# Patient Record
Sex: MALE | Race: WHITE | NOT HISPANIC OR LATINO
[De-identification: names, ages, dates, MRNs, and addresses within clinical notes are randomized per-mention and may not be internally consistent; named-entity substitution may affect disease eponyms.]

---

## 2020-10-01 ENCOUNTER — OFFICE VISIT (OUTPATIENT)
Age: 55
End: 2020-10-01

## 2021-01-11 ENCOUNTER — OFFICE VISIT (OUTPATIENT)
Age: 56
End: 2021-01-11

## 2021-02-13 ENCOUNTER — OFFICE VISIT (OUTPATIENT)
Age: 56
End: 2021-02-13

## 2021-03-31 ENCOUNTER — TELEPHONE ENCOUNTER (OUTPATIENT)
Dept: URBAN - METROPOLITAN AREA CLINIC 9 | Facility: CLINIC | Age: 56
End: 2021-03-31

## 2021-03-31 ENCOUNTER — OFFICE VISIT (OUTPATIENT)
Age: 56
End: 2021-03-31

## 2021-04-12 ENCOUNTER — OFFICE VISIT (OUTPATIENT)
Dept: URBAN - METROPOLITAN AREA CLINIC 9 | Facility: CLINIC | Age: 56
End: 2021-04-12

## 2021-04-29 ENCOUNTER — TELEPHONE ENCOUNTER (OUTPATIENT)
Dept: URBAN - METROPOLITAN AREA CLINIC 9 | Facility: CLINIC | Age: 56
End: 2021-04-29

## 2021-04-29 ENCOUNTER — OFFICE VISIT (OUTPATIENT)
Dept: URBAN - METROPOLITAN AREA CLINIC 9 | Facility: CLINIC | Age: 56
End: 2021-04-29

## 2021-05-13 ENCOUNTER — TELEPHONE ENCOUNTER (OUTPATIENT)
Dept: URBAN - METROPOLITAN AREA CLINIC 9 | Facility: CLINIC | Age: 56
End: 2021-05-13

## 2021-05-14 ENCOUNTER — OFFICE VISIT (OUTPATIENT)
Dept: URBAN - METROPOLITAN AREA CLINIC 7 | Facility: CLINIC | Age: 56
End: 2021-05-14

## 2021-05-17 ENCOUNTER — OFFICE VISIT (OUTPATIENT)
Dept: URBAN - METROPOLITAN AREA SURGERY CENTER 9 | Facility: SURGERY CENTER | Age: 56
End: 2021-05-17

## 2021-05-17 ENCOUNTER — TELEPHONE ENCOUNTER (OUTPATIENT)
Dept: URBAN - METROPOLITAN AREA CLINIC 9 | Facility: CLINIC | Age: 56
End: 2021-05-17

## 2021-05-19 ENCOUNTER — TELEPHONE ENCOUNTER (OUTPATIENT)
Dept: URBAN - METROPOLITAN AREA CLINIC 9 | Facility: CLINIC | Age: 56
End: 2021-05-19

## 2021-05-19 ENCOUNTER — OFFICE VISIT (OUTPATIENT)
Dept: URBAN - METROPOLITAN AREA CLINIC 7 | Facility: CLINIC | Age: 56
End: 2021-05-19

## 2021-05-24 ENCOUNTER — OFFICE VISIT (OUTPATIENT)
Dept: URBAN - METROPOLITAN AREA CLINIC 7 | Facility: CLINIC | Age: 56
End: 2021-05-24

## 2021-05-24 ENCOUNTER — OFFICE VISIT (OUTPATIENT)
Dept: URBAN - METROPOLITAN AREA SURGERY CENTER 9 | Facility: SURGERY CENTER | Age: 56
End: 2021-05-24

## 2021-05-28 ENCOUNTER — OFFICE VISIT (OUTPATIENT)
Dept: URBAN - METROPOLITAN AREA CLINIC 7 | Facility: CLINIC | Age: 56
End: 2021-05-28

## 2021-06-02 ENCOUNTER — OFFICE VISIT (OUTPATIENT)
Dept: URBAN - METROPOLITAN AREA SURGERY CENTER 5 | Facility: SURGERY CENTER | Age: 56
End: 2021-06-02

## 2021-06-03 ENCOUNTER — TELEPHONE ENCOUNTER (OUTPATIENT)
Dept: URBAN - METROPOLITAN AREA CLINIC 9 | Facility: CLINIC | Age: 56
End: 2021-06-03

## 2021-06-15 ENCOUNTER — TELEPHONE ENCOUNTER (OUTPATIENT)
Dept: URBAN - METROPOLITAN AREA CLINIC 9 | Facility: CLINIC | Age: 56
End: 2021-06-15

## 2021-06-18 ENCOUNTER — TELEPHONE ENCOUNTER (OUTPATIENT)
Dept: URBAN - METROPOLITAN AREA CLINIC 9 | Facility: CLINIC | Age: 56
End: 2021-06-18

## 2021-06-21 ENCOUNTER — TELEPHONE ENCOUNTER (OUTPATIENT)
Dept: URBAN - METROPOLITAN AREA CLINIC 9 | Facility: CLINIC | Age: 56
End: 2021-06-21

## 2021-06-28 ENCOUNTER — OFFICE VISIT (OUTPATIENT)
Dept: URBAN - METROPOLITAN AREA CLINIC 7 | Facility: CLINIC | Age: 56
End: 2021-06-28

## 2021-06-30 ENCOUNTER — TELEPHONE ENCOUNTER (OUTPATIENT)
Dept: URBAN - METROPOLITAN AREA CLINIC 9 | Facility: CLINIC | Age: 56
End: 2021-06-30

## 2021-07-13 ENCOUNTER — TELEPHONE ENCOUNTER (OUTPATIENT)
Dept: URBAN - METROPOLITAN AREA CLINIC 9 | Facility: CLINIC | Age: 56
End: 2021-07-13

## 2021-08-23 ENCOUNTER — OFFICE VISIT (OUTPATIENT)
Dept: URBAN - METROPOLITAN AREA CLINIC 7 | Facility: CLINIC | Age: 56
End: 2021-08-23

## 2021-10-29 ENCOUNTER — TELEPHONE ENCOUNTER (OUTPATIENT)
Dept: URBAN - METROPOLITAN AREA CLINIC 9 | Facility: CLINIC | Age: 56
End: 2021-10-29

## 2022-05-04 ENCOUNTER — OFFICE VISIT (OUTPATIENT)
Dept: URBAN - METROPOLITAN AREA CLINIC 7 | Facility: CLINIC | Age: 57
End: 2022-05-04

## 2022-05-09 ENCOUNTER — TELEPHONE ENCOUNTER (OUTPATIENT)
Dept: URBAN - METROPOLITAN AREA CLINIC 9 | Facility: CLINIC | Age: 57
End: 2022-05-09

## 2022-07-09 ENCOUNTER — TELEPHONE ENCOUNTER (OUTPATIENT)
Dept: URBAN - METROPOLITAN AREA CLINIC 121 | Facility: CLINIC | Age: 57
End: 2022-07-09

## 2022-07-10 ENCOUNTER — TELEPHONE ENCOUNTER (OUTPATIENT)
Dept: URBAN - METROPOLITAN AREA CLINIC 121 | Facility: CLINIC | Age: 57
End: 2022-07-10

## 2022-07-30 ENCOUNTER — TELEPHONE ENCOUNTER (OUTPATIENT)
Age: 57
End: 2022-07-30

## 2022-07-30 RX ORDER — DICYCLOMINE HYDROCHLORIDE 10 MG/1
1 (ONE) CAPSULE ORAL
Qty: 0 | Refills: 16 | OUTPATIENT
Start: 2021-05-14 | End: 2022-05-04

## 2022-07-30 RX ORDER — LORATADINE 10 MG
1 TABLET,DISINTEGRATING ORAL
Qty: 0 | Refills: 2 | OUTPATIENT
Start: 2021-05-12 | End: 2021-05-14

## 2022-07-30 RX ORDER — FAMOTIDINE 40 MG/1
1 TABLET, FILM COATED ORAL
Qty: 0 | Refills: 2 | OUTPATIENT
Start: 2021-04-12 | End: 2022-05-04

## 2022-07-30 RX ORDER — LINACLOTIDE 145 UG/1
1 (ONE) CAPSULE, GELATIN COATED ORAL DAILY
Qty: 0 | Refills: 4 | OUTPATIENT
Start: 2021-04-29 | End: 2021-05-14

## 2022-07-30 RX ORDER — LACTULOSE 10 G/15ML
1 SOLUTION ORAL
Qty: 0 | Refills: 2 | OUTPATIENT
Start: 2021-04-29 | End: 2021-05-12

## 2022-07-30 RX ORDER — HYOSCYAMINE SULFATE 0.12 MG/1
1 (ONE) TABLET ORAL
Qty: 0 | Refills: 2 | OUTPATIENT
Start: 2021-07-13 | End: 2021-08-12

## 2022-07-31 ENCOUNTER — TELEPHONE ENCOUNTER (OUTPATIENT)
Age: 57
End: 2022-07-31

## 2022-07-31 RX ORDER — LACTULOSE 10 G/15ML
1 SOLUTION ORAL
Qty: 0 | Refills: 2 | Status: ACTIVE | COMMUNITY
Start: 2021-04-29

## 2022-07-31 RX ORDER — HYOSCYAMINE SULFATE 0.12 MG/1
1 (ONE) TABLET ORAL
Qty: 0 | Refills: 2 | Status: ACTIVE | COMMUNITY
Start: 2021-07-13

## 2022-07-31 RX ORDER — HYOSCYAMINE SULFATE 0.12 MG/1
1 (ONE) TABLET ORAL
Qty: 0 | Refills: 2 | Status: ACTIVE | COMMUNITY
Start: 2021-06-18

## 2022-07-31 RX ORDER — LINACLOTIDE 145 UG/1
1 (ONE) CAPSULE, GELATIN COATED ORAL DAILY
Qty: 0 | Refills: 4 | Status: ACTIVE | COMMUNITY
Start: 2021-04-29

## 2022-07-31 RX ORDER — POLYETHYLENE GLYCOL 3350 17 G/17G
1 (ONE) POWDER, FOR SOLUTION ORAL TWICE DAILY
Qty: 0 | Refills: 5 | Status: ACTIVE | COMMUNITY
Start: 2022-05-04

## 2022-07-31 RX ORDER — LORATADINE 10 MG
1 TABLET,DISINTEGRATING ORAL
Qty: 0 | Refills: 2 | Status: ACTIVE | COMMUNITY
Start: 2021-05-12

## 2022-07-31 RX ORDER — SENNA 8.6 MG/1
1 (ONE) TABLET, COATED ORAL
Qty: 0 | Refills: 5 | Status: ACTIVE | COMMUNITY
Start: 2022-05-04

## 2022-07-31 RX ORDER — FAMOTIDINE 40 MG/1
1 TABLET, FILM COATED ORAL
Qty: 0 | Refills: 2 | Status: ACTIVE | COMMUNITY
Start: 2021-04-12

## 2022-07-31 RX ORDER — LORATADINE 10 MG
1 TABLET,DISINTEGRATING ORAL
Qty: 0 | Refills: 2 | Status: ACTIVE | COMMUNITY
Start: 2021-04-29

## 2022-07-31 RX ORDER — LACTULOSE 10 G/15ML
1 SOLUTION ORAL
Qty: 0 | Refills: 2 | Status: ACTIVE | COMMUNITY
Start: 2021-04-12

## 2022-07-31 RX ORDER — LORATADINE 10 MG
1 TABLET,DISINTEGRATING ORAL DAILY
Qty: 0 | Refills: 2 | Status: ACTIVE | COMMUNITY
Start: 2021-04-12

## 2022-07-31 RX ORDER — DICYCLOMINE HYDROCHLORIDE 10 MG/1
1 (ONE) CAPSULE ORAL
Qty: 0 | Refills: 16 | Status: ACTIVE | COMMUNITY
Start: 2021-05-14

## 2023-05-05 ENCOUNTER — TELEPHONE ENCOUNTER (OUTPATIENT)
Dept: URBAN - METROPOLITAN AREA CLINIC 7 | Facility: CLINIC | Age: 58
End: 2023-05-05

## 2023-05-16 ENCOUNTER — OFFICE VISIT (OUTPATIENT)
Dept: URBAN - METROPOLITAN AREA CLINIC 7 | Facility: CLINIC | Age: 58
End: 2023-05-16
Payer: MEDICARE

## 2023-05-16 VITALS
WEIGHT: 259 LBS | TEMPERATURE: 97.7 F | HEIGHT: 63 IN | DIASTOLIC BLOOD PRESSURE: 70 MMHG | BODY MASS INDEX: 45.89 KG/M2 | SYSTOLIC BLOOD PRESSURE: 120 MMHG

## 2023-05-16 DIAGNOSIS — E11.9 DIABETES MELLITUS: ICD-10-CM

## 2023-05-16 DIAGNOSIS — K58.1 IRRITABLE BOWEL SYNDROME WITH CONSTIPATION: ICD-10-CM

## 2023-05-16 DIAGNOSIS — I25.10 CORONARY ARTERY DISEASE INVOLVING NATIVE CORONARY ARTERY OF NATIVE HEART WITHOUT ANGINA PECTORIS: ICD-10-CM

## 2023-05-16 DIAGNOSIS — F10.11 ALCOHOL ABUSE, IN REMISSION: ICD-10-CM

## 2023-05-16 DIAGNOSIS — K59.04 CHRONIC IDIOPATHIC CONSTIPATION: ICD-10-CM

## 2023-05-16 DIAGNOSIS — R10.84 ABDOMINAL PAIN, GENERALIZED: ICD-10-CM

## 2023-05-16 DIAGNOSIS — R19.8 IRREGULAR BOWEL HABITS: ICD-10-CM

## 2023-05-16 PROCEDURE — 99214 OFFICE O/P EST MOD 30 MIN: CPT | Performed by: INTERNAL MEDICINE

## 2023-05-16 RX ORDER — FAMOTIDINE 40 MG/1
1 TABLET, FILM COATED ORAL
Qty: 0 | Refills: 2 | Status: DISCONTINUED | COMMUNITY
Start: 2021-04-12

## 2023-05-16 RX ORDER — POLYETHYLENE GLYCOL 3350 17 G/17G
1 (ONE) POWDER, FOR SOLUTION ORAL TWICE DAILY
Qty: 0 | Refills: 5 | Status: DISCONTINUED | COMMUNITY
Start: 2022-05-04

## 2023-05-16 RX ORDER — LINACLOTIDE 145 UG/1
1 (ONE) CAPSULE, GELATIN COATED ORAL DAILY
Qty: 0 | Refills: 4 | Status: DISCONTINUED | COMMUNITY
Start: 2021-04-29

## 2023-05-16 RX ORDER — PLECANATIDE 3 MG/1
1 TABLET TABLET ORAL ONCE A DAY
Qty: 30 | Refills: 3 | OUTPATIENT
Start: 2023-05-16 | End: 2023-09-13

## 2023-05-16 RX ORDER — LACTULOSE 10 G/15ML
1 SOLUTION ORAL
Qty: 0 | Refills: 2 | Status: DISCONTINUED | COMMUNITY
Start: 2021-04-29

## 2023-05-16 RX ORDER — SENNA 8.6 MG/1
1 (ONE) TABLET, COATED ORAL
Qty: 0 | Refills: 5 | Status: DISCONTINUED | COMMUNITY
Start: 2022-05-04

## 2023-05-16 RX ORDER — HYOSCYAMINE SULFATE 0.12 MG/1
1 (ONE) TABLET ORAL
Qty: 0 | Refills: 2 | Status: DISCONTINUED | COMMUNITY
Start: 2021-07-13

## 2023-05-16 RX ORDER — FAMOTIDINE 40 MG/1
1 TABLET TABLET, FILM COATED ORAL
Qty: 60 TABLET | Refills: 3 | OUTPATIENT
Start: 2023-05-16

## 2023-05-16 RX ORDER — LORATADINE 10 MG
1 TABLET,DISINTEGRATING ORAL
Qty: 0 | Refills: 2 | Status: ACTIVE | COMMUNITY
Start: 2021-05-12

## 2023-05-16 RX ORDER — DICYCLOMINE HYDROCHLORIDE 10 MG/1
1 (ONE) CAPSULE ORAL
Qty: 0 | Refills: 16 | Status: DISCONTINUED | COMMUNITY
Start: 2021-05-14

## 2023-05-16 NOTE — HPI-TODAY'S VISIT:
Patient was last seen in the office in May 2022.  He had been evaluated for altered bowel habits which were chronic as well as a history of alcohol-related liver disease now sober.  Status post cholecystectomy.  Colonoscopy in August 2020 was a poor prep although grossly normal.  EGD also done in August 2020 was normal with biopsies negative for celiac disease and H. pylori.  CT scan in March 2021 demonstrated evidence of early cirrhosis on CT without contrast but normal bowel.  CT in February 2021 demonstrated a normal bowel and this was done with contrast.  Normal CBC and CMP.  He had tried Bentyl but had an allergic response.  Stool for C. difficile E. coli Giardia cryptosporidium had been negative with elastase negative and calprotectin 37.  Celiac and wheat antibodies negative with CRP normal.  Repeat EGD and colonoscopy in June 2021 demonstrated largely normal upper endoscopy with normal biopsies and colonoscopy June 2021 demonstrated 1 tubular adenoma normal terminal ileum and normal colon with negative biopsies with adequate prep with some scattered stool.  Overall was having issues with chronic constipation small stools gas bloating abdominal pain.  Had used hyoscyamine as needed and had Linzess but did not use it.  I continue to advise using fiber and MiraLAX aggressively but was only doing as needed laxative for the most part due to cost limitations and inability to actually get laxative agents.  I did advise that he repeat his colonoscopy in September 2024 with a 2-day prep.  His FibroScan also demonstrated severe steatosis but only mild fibrosis and normal obvious evidence of cirrhosis.  At his last visit, he had just had a recent back surgery in March 2022 and then developed severe constipation symptoms.  He was treated with Fleet enemas and magnesium citrate with movement.  Essentially was continued to have an issue affording any of his medications.  He continues to suggest to being on a regimen of MiraLAX and mineral oil I did not feel he needed more testing at last visit.  More recently he did call stating that he had gone to the ER for irregular bowel habits and he had done a stool test which demonstrated that he was positive for C. difficile although he was having constipation.  He was started on treatment with metronidazole because he has an allergy to vancomycin and fidaxomicin would be too expensive.  I did review his imaging from the hospital which demonstrated a reassuring CT scan 4/2023.  Because of issues with cost again with the MiraLAX I did recommend that he stop the office and  samples of MiraLAX and also provided the recipe for flaxseed water to help with his bowels as well as magnesium citrate.  Tells me his stomach has been acting for some time and has had multiple ER visits for this. He continues to have issues with constipation, and goes multiple days without a BM. He continues to have issues getting medications due to cost.

## 2023-05-19 ENCOUNTER — TELEPHONE ENCOUNTER (OUTPATIENT)
Dept: URBAN - METROPOLITAN AREA CLINIC 7 | Facility: CLINIC | Age: 58
End: 2023-05-19

## 2023-06-06 ENCOUNTER — OFFICE VISIT (OUTPATIENT)
Dept: URBAN - METROPOLITAN AREA CLINIC 7 | Facility: CLINIC | Age: 58
End: 2023-06-06

## 2023-09-14 ENCOUNTER — DASHBOARD ENCOUNTERS (OUTPATIENT)
Age: 58
End: 2023-09-14

## 2023-09-14 ENCOUNTER — OFFICE VISIT (OUTPATIENT)
Dept: URBAN - METROPOLITAN AREA CLINIC 7 | Facility: CLINIC | Age: 58
End: 2023-09-14
Payer: MEDICARE

## 2023-09-14 VITALS
TEMPERATURE: 97.6 F | HEIGHT: 63 IN | WEIGHT: 256 LBS | SYSTOLIC BLOOD PRESSURE: 102 MMHG | DIASTOLIC BLOOD PRESSURE: 60 MMHG | HEART RATE: 72 BPM | BODY MASS INDEX: 45.36 KG/M2

## 2023-09-14 DIAGNOSIS — K21.9 GASTROESOPHAGEAL REFLUX DISEASE, UNSPECIFIED WHETHER ESOPHAGITIS PRESENT: ICD-10-CM

## 2023-09-14 DIAGNOSIS — K58.1 IRRITABLE BOWEL SYNDROME WITH CONSTIPATION: ICD-10-CM

## 2023-09-14 DIAGNOSIS — R10.84 ABDOMINAL PAIN, GENERALIZED: ICD-10-CM

## 2023-09-14 DIAGNOSIS — R19.8 IRREGULAR BOWEL HABITS: ICD-10-CM

## 2023-09-14 DIAGNOSIS — F10.11 ALCOHOL ABUSE, IN REMISSION: ICD-10-CM

## 2023-09-14 DIAGNOSIS — E11.9 DIABETES MELLITUS: ICD-10-CM

## 2023-09-14 DIAGNOSIS — I25.10 CORONARY ARTERY DISEASE INVOLVING NATIVE CORONARY ARTERY OF NATIVE HEART WITHOUT ANGINA PECTORIS: ICD-10-CM

## 2023-09-14 DIAGNOSIS — K59.04 CHRONIC IDIOPATHIC CONSTIPATION: ICD-10-CM

## 2023-09-14 PROCEDURE — 99214 OFFICE O/P EST MOD 30 MIN: CPT | Performed by: INTERNAL MEDICINE

## 2023-09-14 RX ORDER — PREDNISONE 10 MG/1
TAKE 1 TABLET BY MOUTH EVERY DAY FOR 7 DAYS TABLET ORAL
Qty: 7 EACH | Refills: 0 | Status: DISCONTINUED | COMMUNITY

## 2023-09-14 RX ORDER — ATORVASTATIN CALCIUM 80 MG/1
TAKE 1 TABLET BY MOUTH EVERY DAY AT NIGHT TABLET, FILM COATED ORAL
Qty: 90 EACH | Refills: 2 | Status: ACTIVE | COMMUNITY

## 2023-09-14 RX ORDER — LORATADINE 10 MG
1 TABLET,DISINTEGRATING ORAL
Qty: 0 | Refills: 2 | Status: ACTIVE | COMMUNITY
Start: 2021-05-12

## 2023-09-14 RX ORDER — HYOSCYAMINE SULFATE 0.125 MG
1 TABLET ON THE TONGUE AND ALLOW TO DISSOLVE  AS NEEDED TABLET,DISINTEGRATING ORAL
Qty: 30 | Refills: 3 | OUTPATIENT
Start: 2023-09-14 | End: 2024-01-11

## 2023-09-14 RX ORDER — ISOSORBIDE DINITRATE 30 MG/1
1 TABLET TABLET ORAL TWICE A DAY
Status: ACTIVE | COMMUNITY

## 2023-09-14 RX ORDER — ALBUTEROL SULFATE 90 UG/1
INHALE 2 PUFFS BY MOUTH EVERY 4 HOURS AS NEEDED FOR WHEEZE AEROSOL, METERED RESPIRATORY (INHALATION)
Qty: 18 GRAM | Refills: 0 | Status: ACTIVE | COMMUNITY

## 2023-09-14 RX ORDER — IPRATROPIUM BROMIDE 0.5 MG/2.5ML
TAKE 2.5 ML BY NEBULIZATION EVERY 6 HOURS AS NEEDED FOR WHEEZING OR SHORTNESS OF BREATH SOLUTION RESPIRATORY (INHALATION)
Qty: 62.5 MILLILITER | Refills: 0 | Status: DISCONTINUED | COMMUNITY

## 2023-09-14 RX ORDER — METFORMIN HYDROCHLORIDE 500 MG/1
1 TABLET WITH A MEAL TABLET, FILM COATED ORAL ONCE A DAY
Qty: 90 TABLET | Status: ACTIVE | COMMUNITY

## 2023-09-14 RX ORDER — METOCLOPRAMIDE HYDROCHLORIDE 10 MG/1
1 TABLET BEFORE MEALS TABLET ORAL
Status: ACTIVE | COMMUNITY

## 2023-09-14 RX ORDER — LAMOTRIGINE 100 MG/1
1 TABLET TABLET ORAL TWICE A DAY
Qty: 180 TABLET | Status: ACTIVE | COMMUNITY

## 2023-09-14 RX ORDER — LEVETIRACETAM 500 MG/1
TAKE 3 TABLETS BY MOUTH TWICE A DAY TABLET, EXTENDED RELEASE ORAL
Qty: 540 EACH | Refills: 1 | Status: ACTIVE | COMMUNITY

## 2023-09-14 RX ORDER — CLOPIDOGREL BISULFATE 75 MG/1
1 TABLET TABLET, FILM COATED ORAL ONCE A DAY
Qty: 90 TABLET | Status: ACTIVE | COMMUNITY

## 2023-09-14 RX ORDER — FLUTICASONE PROPIONATE AND SALMETEROL 50; 500 UG/1; UG/1
TAKE 1 PUFF BY MOUTH TWICE A DAY POWDER RESPIRATORY (INHALATION)
Qty: 60 EACH | Refills: 0 | Status: ACTIVE | COMMUNITY

## 2023-09-14 RX ORDER — FAMOTIDINE 40 MG/1
1 TABLET TABLET, FILM COATED ORAL ONCE A DAY
Qty: 30 TABLET | Refills: 3 | Status: ACTIVE | COMMUNITY
Start: 2023-05-16

## 2023-09-14 RX ORDER — INSULIN ASPART 100 [IU]/ML
INJECT 15 UNITS INTO THE SKIN AS NEEDED FOR HIGH BLOOD SUGAR. USES AS NEEDED ONLY INJECTION, SOLUTION INTRAVENOUS; SUBCUTANEOUS
Qty: 15 MILLILITER | Refills: 1 | Status: ACTIVE | COMMUNITY

## 2023-09-14 NOTE — HPI-TODAY'S VISIT:
Patient was last seen in the office in May 2023.  He had been evaluated for altered bowel habits which were chronic as well as a history of alcohol-related liver disease, now sober.  Status post cholecystectomy.  Colonoscopy in August 2020 was a poor prep although grossly normal.  EGD also done in August 2020 was normal with biopsies negative for celiac disease and H. pylori.  CT scan in March 2021 demonstrated evidence of early cirrhosis on CT without contrast but normal bowel.  CT in February 2021 demonstrated a normal bowel and this was done with contrast.  Normal CBC and CMP.  He had tried Bentyl but had an allergic response.  Stool for C. difficile E. coli Giardia cryptosporidium had been negative with elastase negative and calprotectin 37.  Celiac and wheat antibodies negative with CRP normal.  Repeat EGD and colonoscopy in June 2021 demonstrated largely normal upper endoscopy with normal biopsies and colonoscopy June 2021 demonstrated 1 tubular adenoma normal terminal ileum and normal colon with negative biopsies with adequate prep with some scattered stool.  Overall was having issues with chronic constipation small stools gas bloating abdominal pain.  Had used hyoscyamine as needed and had Linzess but did not use it. I continue to advise using fiber and MiraLAX aggressively but was only doing as needed laxative for the most part due to cost limitations and inability to actually get laxative agents. I did advise that he repeat his colonoscopy in September 2024 with a 2-day prep.  His FibroScan also demonstrated severe steatosis but only mild fibrosis and normal obvious evidence of cirrhosis. More recently he did call stating that he had gone to the ER for irregular bowel habits and he had done a stool test which demonstrated that he was positive for C. difficile although he was having constipation.  He was started on treatment with metronidazole because he has an allergy to vancomycin and fidaxomicin would be too expensive.  I did review his imaging from the hospital which demonstrated a reassuring CT scan 4/2023.  Because of issues with cost again with the MiraLAX I did recommend that he stop the office and  samples of MiraLAX and also provided the recipe for flaxseed water to help with his bowels as well as magnesium citrate. LV, his stomach had been acting for some time and has had multiple ER visits for this. He continues to have issues with constipation, and goes multiple days without a BM. He continues to have issues getting medications due to cost. I reiterated that he needs to be on a bowel regimen to help with regularity, because his constipation is pretty severe. Advised miralax BID, 2 tabs senna BID, and tried trulance 3 mg daily (if he is able to get it). He does not have cdiff infection as his issue is actually constipation. Famotidine 40 mg BID for possible reflux, GERD symptoms.  He has been having ongoing issues with constipation, then followed by diarrhea. Similar symptoms to the past. Ongoing, chronic. He went back to the ER 9/4/23 and had CT done which was normal. Lower abd pain, periumbilical in nature. Upset stomach, bloating, distention. Hx of plavix for CAD with stents.

## 2025-05-13 ENCOUNTER — TELEPHONE ENCOUNTER (OUTPATIENT)
Dept: URBAN - METROPOLITAN AREA CLINIC 7 | Facility: CLINIC | Age: 60
End: 2025-05-13

## 2025-05-13 ENCOUNTER — OFFICE VISIT (OUTPATIENT)
Dept: URBAN - METROPOLITAN AREA CLINIC 7 | Facility: CLINIC | Age: 60
End: 2025-05-13
Payer: COMMERCIAL

## 2025-05-13 ENCOUNTER — LAB OUTSIDE AN ENCOUNTER (OUTPATIENT)
Dept: URBAN - METROPOLITAN AREA CLINIC 7 | Facility: CLINIC | Age: 60
End: 2025-05-13

## 2025-05-13 DIAGNOSIS — R19.8 ALTERNATING CONSTIPATION AND DIARRHEA: ICD-10-CM

## 2025-05-13 DIAGNOSIS — R11.2 NAUSEA AND VOMITING, UNSPECIFIED VOMITING TYPE: ICD-10-CM

## 2025-05-13 PROCEDURE — 99204 OFFICE O/P NEW MOD 45 MIN: CPT | Performed by: INTERNAL MEDICINE

## 2025-05-13 RX ORDER — BACLOFEN 10 MG/1
1 TABLET AS NEEDED TABLET ORAL TWICE A DAY
Status: ACTIVE | COMMUNITY

## 2025-05-13 RX ORDER — POLYETHYLENE GLYCOL 3350, NF POWDER FOR SOLUTION, LAXATIVE 17 G/D
1 SCOOP MIXED WITH 8 OUNCES OF FLUID POWDER, FOR SOLUTION ORAL ONCE A DAY
Status: ACTIVE | COMMUNITY

## 2025-05-13 RX ORDER — LORATADINE 10 MG
1 TABLET,DISINTEGRATING ORAL
Qty: 0 | Refills: 2 | Status: DISCONTINUED | COMMUNITY
Start: 2021-05-12

## 2025-05-13 RX ORDER — ALBUTEROL SULFATE 90 UG/1
INHALE 2 PUFFS BY MOUTH EVERY 4 HOURS AS NEEDED FOR WHEEZE AEROSOL, METERED RESPIRATORY (INHALATION)
Qty: 18 GRAM | Refills: 0 | Status: DISCONTINUED | COMMUNITY

## 2025-05-13 RX ORDER — FAMOTIDINE 20 MG/1
TAKE 1 TABLET TABLET, FILM COATED ORAL ONCE A DAY
Status: ACTIVE | COMMUNITY

## 2025-05-13 RX ORDER — FLUTICASONE PROPIONATE AND SALMETEROL 50; 500 UG/1; UG/1
TAKE 1 PUFF BY MOUTH TWICE A DAY POWDER RESPIRATORY (INHALATION)
Qty: 60 EACH | Refills: 0 | Status: DISCONTINUED | COMMUNITY

## 2025-05-13 RX ORDER — ISOSORBIDE DINITRATE 30 MG/1
1 TABLET TABLET ORAL TWICE A DAY
Status: DISCONTINUED | COMMUNITY

## 2025-05-13 RX ORDER — DULOXETINE 30 MG/1
1 CAPSULE CAPSULE, DELAYED RELEASE PELLETS ORAL TWICE A DAY
Status: ACTIVE | COMMUNITY

## 2025-05-13 RX ORDER — LEVETIRACETAM 500 MG/1
4 TABLETS TABLET, FILM COATED, EXTENDED RELEASE ORAL TWICE A DAY
Status: ACTIVE | COMMUNITY

## 2025-05-13 RX ORDER — INSULIN ASPART 100 [IU]/ML
INJECT 25 UNITS INJECTION, SOLUTION INTRAVENOUS; SUBCUTANEOUS
Status: ACTIVE | COMMUNITY

## 2025-05-13 RX ORDER — ALBUTEROL SULFATE 2.5 MG/3ML
3 ML AS NEEDED SOLUTION RESPIRATORY (INHALATION)
Status: ACTIVE | COMMUNITY

## 2025-05-13 RX ORDER — CLOPIDOGREL BISULFATE 75 MG/1
1 TABLET TABLET, FILM COATED ORAL ONCE A DAY
Qty: 90 TABLET | Status: DISCONTINUED | COMMUNITY

## 2025-05-13 RX ORDER — FAMOTIDINE 40 MG/1
1 TABLET TABLET, FILM COATED ORAL ONCE A DAY
Qty: 30 TABLET | Refills: 3 | Status: DISCONTINUED | COMMUNITY
Start: 2023-05-16

## 2025-05-13 RX ORDER — METFORMIN HYDROCHLORIDE 500 MG/1
1 TABLET WITH A MEAL TABLET, FILM COATED ORAL ONCE A DAY
Qty: 90 TABLET | Status: DISCONTINUED | COMMUNITY

## 2025-05-13 RX ORDER — INSULIN DEGLUDEC INJECTION 100 U/ML
INJECT 15 UNITS INJECTION, SOLUTION SUBCUTANEOUS DAILY
Status: ACTIVE | COMMUNITY

## 2025-05-13 RX ORDER — CLOPIDOGREL BISULFATE 75 MG/1
1 TABLET TABLET ORAL ONCE A DAY
Status: ACTIVE | COMMUNITY

## 2025-05-13 RX ORDER — LAMOTRIGINE 100 MG/1
1 TABLET TABLET ORAL TWICE A DAY
Qty: 180 TABLET | Status: DISCONTINUED | COMMUNITY

## 2025-05-13 RX ORDER — FLUTICASONE PROPIONATE AND SALMETEROL 250; 50 UG/1; UG/1
1 PUFF POWDER RESPIRATORY (INHALATION) TWICE A DAY
Status: ACTIVE | COMMUNITY

## 2025-05-13 RX ORDER — SUCRALFATE 1 G/1
1 TABLET ON AN EMPTY STOMACH TABLET ORAL TWICE A DAY
Status: ACTIVE | COMMUNITY

## 2025-05-13 RX ORDER — MECLIZINE HCL 12.5 MG 12.5 MG/1
1 TABLET AS NEEDED TABLET ORAL
Status: ACTIVE | COMMUNITY

## 2025-05-13 RX ORDER — DICYCLOMINE HYDROCHLORIDE 20 MG/1
1 TABLET TABLET ORAL
Status: ACTIVE | COMMUNITY

## 2025-05-13 RX ORDER — AMLODIPINE BESYLATE 2.5 MG/1
1 TABLET TABLET ORAL TWICE A DAY
Status: ACTIVE | COMMUNITY

## 2025-05-13 RX ORDER — ATORVASTATIN CALCIUM 80 MG/1
TAKE 1 TABLET BY MOUTH EVERY DAY AT NIGHT TABLET, FILM COATED ORAL
Qty: 90 EACH | Refills: 2 | Status: DISCONTINUED | COMMUNITY

## 2025-05-13 RX ORDER — METOCLOPRAMIDE HYDROCHLORIDE 10 MG/1
1 TABLET BEFORE MEALS TABLET ORAL
Status: DISCONTINUED | COMMUNITY

## 2025-05-13 RX ORDER — RANOLAZINE 1000 MG/1
1 TABLET TABLET, EXTENDED RELEASE ORAL TWICE A DAY
Status: ACTIVE | COMMUNITY

## 2025-05-13 RX ORDER — LEVETIRACETAM 500 MG/1
TAKE 3 TABLETS BY MOUTH TWICE A DAY TABLET, EXTENDED RELEASE ORAL
Qty: 540 EACH | Refills: 1 | Status: DISCONTINUED | COMMUNITY

## 2025-05-13 RX ORDER — ATORVASTATIN CALCIUM 80 MG/1
1 TABLET TABLET, FILM COATED ORAL ONCE A DAY
Status: ACTIVE | COMMUNITY

## 2025-05-13 RX ORDER — MUPIROCIN 20 MG/G
1 APPLICATION OINTMENT TOPICAL TWICE A DAY
Status: ACTIVE | COMMUNITY

## 2025-05-13 RX ORDER — ALBUTEROL SULFATE 90 UG/1
1 PUFF AS NEEDED AEROSOL, METERED RESPIRATORY (INHALATION)
Status: ACTIVE | COMMUNITY

## 2025-05-13 RX ORDER — CELECOXIB 200 MG/1
1 CAPSULE AS NEEDED CAPSULE ORAL ONCE A DAY
Status: ACTIVE | COMMUNITY

## 2025-05-13 RX ORDER — INSULIN ASPART 100 [IU]/ML
INJECT 15 UNITS INTO THE SKIN AS NEEDED FOR HIGH BLOOD SUGAR. USES AS NEEDED ONLY INJECTION, SOLUTION INTRAVENOUS; SUBCUTANEOUS
Qty: 15 MILLILITER | Refills: 1 | Status: DISCONTINUED | COMMUNITY

## 2025-05-13 RX ORDER — TIRZEPATIDE 2.5 MG/.5ML
INJECT 0.5ML INJECTION, SOLUTION SUBCUTANEOUS
Status: ACTIVE | COMMUNITY

## 2025-05-13 RX ORDER — PANTOPRAZOLE SODIUM 40 MG/1
1 TABLET TABLET, DELAYED RELEASE ORAL ONCE A DAY
Status: ACTIVE | COMMUNITY

## 2025-05-13 RX ORDER — LAMOTRIGINE 150 MG/1
1 TABLET TABLET ORAL TWICE A DAY
Status: ACTIVE | COMMUNITY

## 2025-05-13 NOTE — HPI-TODAY'S VISIT:
The patient is a 60-year-old man with history of coronary artery disease who presents to our office for complaints of alternating diarrhea and constipation epigastric abdominal pain nausea and vomiting.  Symptoms have been ongoing for several months.  He is on dicyclomine.  He does take linaclotide but has been taking it on an as needed basis he also takes as needed MiraLAX

## 2025-06-04 ENCOUNTER — TELEPHONE ENCOUNTER (OUTPATIENT)
Dept: URBAN - METROPOLITAN AREA SURGERY CENTER 5 | Facility: SURGERY CENTER | Age: 60
End: 2025-06-04

## 2025-06-09 ENCOUNTER — OFFICE VISIT (OUTPATIENT)
Dept: URBAN - METROPOLITAN AREA SURGERY CENTER 5 | Facility: SURGERY CENTER | Age: 60
End: 2025-06-09
Payer: COMMERCIAL

## 2025-06-09 ENCOUNTER — TELEPHONE ENCOUNTER (OUTPATIENT)
Dept: URBAN - METROPOLITAN AREA SURGERY CENTER 5 | Facility: SURGERY CENTER | Age: 60
End: 2025-06-09

## 2025-06-09 ENCOUNTER — CLAIMS CREATED FROM THE CLAIM WINDOW (OUTPATIENT)
Dept: URBAN - METROPOLITAN AREA CLINIC 4 | Facility: CLINIC | Age: 60
End: 2025-06-09
Payer: COMMERCIAL

## 2025-06-09 DIAGNOSIS — I10 HYPERTENSION, UNSPECIFIED TYPE: ICD-10-CM

## 2025-06-09 DIAGNOSIS — K29.60 OTHER GASTRITIS WITHOUT BLEEDING: ICD-10-CM

## 2025-06-09 DIAGNOSIS — E11.9 TYPE 2 DIABETES MELLITUS WITHOUT COMPLICATION, UNSPECIFIED WHETHER LONG TERM INSULIN USE: ICD-10-CM

## 2025-06-09 DIAGNOSIS — K21.9 GASTRO-ESOPHAGEAL REFLUX DISEASE WITHOUT ESOPHAGITIS: ICD-10-CM

## 2025-06-09 DIAGNOSIS — K22.89 OTHER SPECIFIED DISEASE OF ESOPHAGUS: ICD-10-CM

## 2025-06-09 DIAGNOSIS — K29.70 GASTRITIS: ICD-10-CM

## 2025-06-09 DIAGNOSIS — K29.70 GASTRITIS, UNSPECIFIED, WITHOUT BLEEDING: ICD-10-CM

## 2025-06-09 PROCEDURE — 43239 EGD BIOPSY SINGLE/MULTIPLE: CPT | Performed by: INTERNAL MEDICINE

## 2025-06-09 PROCEDURE — 00731 ANES UPR GI NDSC PX NOS: CPT | Performed by: NURSE ANESTHETIST, CERTIFIED REGISTERED

## 2025-06-09 PROCEDURE — 88305 TISSUE EXAM BY PATHOLOGIST: CPT | Performed by: PATHOLOGY

## 2025-06-09 PROCEDURE — 88312 SPECIAL STAINS GROUP 1: CPT | Performed by: PATHOLOGY

## 2025-06-09 RX ORDER — CLOPIDOGREL BISULFATE 75 MG/1
1 TABLET TABLET ORAL ONCE A DAY
Status: ACTIVE | COMMUNITY

## 2025-06-09 RX ORDER — FLUTICASONE PROPIONATE AND SALMETEROL 250; 50 UG/1; UG/1
1 PUFF POWDER RESPIRATORY (INHALATION) TWICE A DAY
Status: ACTIVE | COMMUNITY

## 2025-06-09 RX ORDER — POLYETHYLENE GLYCOL 3350, NF POWDER FOR SOLUTION, LAXATIVE 17 G/D
1 SCOOP MIXED WITH 8 OUNCES OF FLUID POWDER, FOR SOLUTION ORAL ONCE A DAY
Status: ACTIVE | COMMUNITY

## 2025-06-09 RX ORDER — INSULIN DEGLUDEC INJECTION 100 U/ML
INJECT 15 UNITS INJECTION, SOLUTION SUBCUTANEOUS DAILY
Status: ACTIVE | COMMUNITY

## 2025-06-09 RX ORDER — TIRZEPATIDE 2.5 MG/.5ML
INJECT 0.5ML INJECTION, SOLUTION SUBCUTANEOUS
Status: ACTIVE | COMMUNITY

## 2025-06-09 RX ORDER — CELECOXIB 200 MG/1
1 CAPSULE AS NEEDED CAPSULE ORAL ONCE A DAY
Status: ACTIVE | COMMUNITY

## 2025-06-09 RX ORDER — ALBUTEROL SULFATE 2.5 MG/3ML
3 ML AS NEEDED SOLUTION RESPIRATORY (INHALATION)
Status: ACTIVE | COMMUNITY

## 2025-06-09 RX ORDER — SUCRALFATE 1 G/1
1 TABLET ON AN EMPTY STOMACH TABLET ORAL TWICE A DAY
Status: ACTIVE | COMMUNITY

## 2025-06-09 RX ORDER — ALBUTEROL SULFATE 90 UG/1
1 PUFF AS NEEDED AEROSOL, METERED RESPIRATORY (INHALATION)
Status: ACTIVE | COMMUNITY

## 2025-06-09 RX ORDER — LAMOTRIGINE 150 MG/1
1 TABLET TABLET ORAL TWICE A DAY
Status: ACTIVE | COMMUNITY

## 2025-06-09 RX ORDER — MECLIZINE HCL 12.5 MG 12.5 MG/1
1 TABLET AS NEEDED TABLET ORAL
Status: ACTIVE | COMMUNITY

## 2025-06-09 RX ORDER — AMLODIPINE BESYLATE 2.5 MG/1
1 TABLET TABLET ORAL TWICE A DAY
Status: ACTIVE | COMMUNITY

## 2025-06-09 RX ORDER — ATORVASTATIN CALCIUM 80 MG/1
1 TABLET TABLET, FILM COATED ORAL ONCE A DAY
Status: ACTIVE | COMMUNITY

## 2025-06-09 RX ORDER — MUPIROCIN 20 MG/G
1 APPLICATION OINTMENT TOPICAL TWICE A DAY
Status: ACTIVE | COMMUNITY

## 2025-06-09 RX ORDER — RANOLAZINE 1000 MG/1
1 TABLET TABLET, EXTENDED RELEASE ORAL TWICE A DAY
Status: ACTIVE | COMMUNITY

## 2025-06-09 RX ORDER — LEVETIRACETAM 500 MG/1
4 TABLETS TABLET, FILM COATED, EXTENDED RELEASE ORAL TWICE A DAY
Status: ACTIVE | COMMUNITY

## 2025-06-09 RX ORDER — BACLOFEN 10 MG/1
1 TABLET AS NEEDED TABLET ORAL TWICE A DAY
Status: ACTIVE | COMMUNITY

## 2025-06-09 RX ORDER — INSULIN ASPART 100 [IU]/ML
INJECT 25 UNITS INJECTION, SOLUTION INTRAVENOUS; SUBCUTANEOUS
Status: ACTIVE | COMMUNITY

## 2025-06-09 RX ORDER — PANTOPRAZOLE SODIUM 40 MG/1
1 TABLET TABLET, DELAYED RELEASE ORAL ONCE A DAY
Status: ACTIVE | COMMUNITY

## 2025-06-09 RX ORDER — DULOXETINE 30 MG/1
1 CAPSULE CAPSULE, DELAYED RELEASE PELLETS ORAL TWICE A DAY
Status: ACTIVE | COMMUNITY

## 2025-06-09 RX ORDER — DICYCLOMINE HYDROCHLORIDE 20 MG/1
1 TABLET TABLET ORAL
Status: ACTIVE | COMMUNITY

## 2025-06-09 RX ORDER — FAMOTIDINE 20 MG/1
TAKE 1 TABLET TABLET, FILM COATED ORAL ONCE A DAY
Status: ACTIVE | COMMUNITY

## 2025-06-11 ENCOUNTER — OFFICE VISIT (OUTPATIENT)
Dept: URBAN - METROPOLITAN AREA SURGERY CENTER 5 | Facility: SURGERY CENTER | Age: 60
End: 2025-06-11

## 2025-07-11 ENCOUNTER — TELEPHONE ENCOUNTER (OUTPATIENT)
Dept: URBAN - METROPOLITAN AREA CLINIC 7 | Facility: CLINIC | Age: 60
End: 2025-07-11

## 2025-07-14 ENCOUNTER — OFFICE VISIT (OUTPATIENT)
Dept: URBAN - METROPOLITAN AREA SURGERY CENTER 5 | Facility: SURGERY CENTER | Age: 60
End: 2025-07-14

## 2025-07-21 ENCOUNTER — OFFICE VISIT (OUTPATIENT)
Dept: URBAN - METROPOLITAN AREA CLINIC 7 | Facility: CLINIC | Age: 60
End: 2025-07-21

## (undated) LAB
ASSAY, ELASTASE, PANCREATIC, FECAL: (no result)
C DIFF AMPLIFIED PROBE: (no result)
C-REACTIVE PROTEIN: (no result)
CRYPTOSPORIDUM/GIARDI, INFCT ANTIGEN: (no result)
CRYPTOSPORIDUM/GIARDI, INFCT ANTIGEN: (no result)
CULTURE, STOOL: (no result)
IGA/IGD/IGG/IGM-EACH: (no result)
IMMUNOASSAY, ANALYTE NOS: (no result)
Lab: (no result)
T-TRANSGLUTAMINASE 9TTG)IGA: (no result)
TSH (THYROID STIMULATING HORMONE): (no result)